# Patient Record
Sex: FEMALE
[De-identification: names, ages, dates, MRNs, and addresses within clinical notes are randomized per-mention and may not be internally consistent; named-entity substitution may affect disease eponyms.]

---

## 2019-12-20 PROBLEM — Z00.00 ENCOUNTER FOR PREVENTIVE HEALTH EXAMINATION: Status: ACTIVE | Noted: 2019-12-20

## 2019-12-30 ENCOUNTER — APPOINTMENT (OUTPATIENT)
Dept: ORTHOPEDIC SURGERY | Facility: CLINIC | Age: 84
End: 2019-12-30
Payer: MEDICARE

## 2019-12-30 VITALS
WEIGHT: 115 LBS | BODY MASS INDEX: 21.16 KG/M2 | HEART RATE: 108 BPM | SYSTOLIC BLOOD PRESSURE: 127 MMHG | DIASTOLIC BLOOD PRESSURE: 69 MMHG | HEIGHT: 62 IN

## 2019-12-30 DIAGNOSIS — Z78.9 OTHER SPECIFIED HEALTH STATUS: ICD-10-CM

## 2019-12-30 DIAGNOSIS — M25.561 PAIN IN RIGHT KNEE: ICD-10-CM

## 2019-12-30 DIAGNOSIS — M25.562 PAIN IN RIGHT KNEE: ICD-10-CM

## 2019-12-30 PROCEDURE — 99204 OFFICE O/P NEW MOD 45 MIN: CPT

## 2019-12-30 PROCEDURE — 73564 X-RAY EXAM KNEE 4 OR MORE: CPT | Mod: RT

## 2019-12-30 RX ORDER — LEVOTHYROXINE SODIUM 0.1 MG/1
100 TABLET ORAL
Refills: 0 | Status: ACTIVE | COMMUNITY

## 2019-12-30 RX ORDER — HYLAN G-F 20 16MG/2ML
16 SYRINGE (ML) INTRAARTICULAR
Qty: 1 | Refills: 0 | Status: ACTIVE | OUTPATIENT
Start: 2019-12-30

## 2019-12-30 RX ORDER — BENAZEPRIL HYDROCHLORIDE AND HYDROCHLOROTHIAZIDE 20; 25 MG/1; MG/1
20-25 TABLET, FILM COATED ORAL
Refills: 0 | Status: ACTIVE | COMMUNITY

## 2019-12-30 RX ORDER — ATORVASTATIN CALCIUM 10 MG/1
10 TABLET, FILM COATED ORAL
Refills: 0 | Status: ACTIVE | COMMUNITY

## 2019-12-30 NOTE — DISCUSSION/SUMMARY
[de-identified] : The patient was advised her findings and showed her x-rays. 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 She is a candidate for Visco supplementation with particular reference to her right knee, being her most symptomatic side. Therefore, authorization should be obtained to proceed with viscosupplementation to the right knee, accordingly

## 2019-12-30 NOTE — HISTORY OF PRESENT ILLNESS
[Worsening] : worsening [___ yrs] : [unfilled] year(s) ago [5] : an average pain level of 5/10 [Intermit.] : ~He/She~ states the symptoms seem to be intermittent [Bending] : worsened by bending [Walking] : worsened by walking [Knee Flexion] : worsened with knee flexion [de-identified] : Pt presents for initial evaluation with pain in bilateral knees, more so on the right knee. Pt is an active 89 year old female, with no known injury.. Pt is not taking any medication for pain. She is requesting information on "gel shots". Pt is using a cane for ambulation. Pt lives at  home with her sister. [de-identified] : s [de-identified] : stairs

## 2019-12-30 NOTE — PHYSICAL EXAM
[de-identified] : Physical examination of both knees discloses a joint motion functionally intact between 5 and 120° with minimal tenderness. Mild crepitus is present to the patellofemoral joint bilaterally.no acute effusions [de-identified] : X-rays taken of both knees and AP lateral skyline open notch views disclosed mild/moderate space narrowing

## 2020-01-08 ENCOUNTER — APPOINTMENT (OUTPATIENT)
Dept: ORTHOPEDIC SURGERY | Facility: CLINIC | Age: 85
End: 2020-01-08
Payer: MEDICARE

## 2020-01-08 PROCEDURE — 20611 DRAIN/INJ JOINT/BURSA W/US: CPT | Mod: RT

## 2020-01-08 PROCEDURE — 99213 OFFICE O/P EST LOW 20 MIN: CPT | Mod: 25

## 2020-01-08 NOTE — PROCEDURE
[Injection] : Injection [Right] : of the right [Joint Pain] : Joint pain [Alcohol] : Alcohol [Patient] : patient [Ultrasound Guided] : The procedure was ultrasound guided.   [Lateral] : lateral [Superior] : superior [1.5  inch] : 1.5 inch needle [22] : a 22-gauge [2 mL Synvisc___(lot #)] : 2 mL Synvisc ~Ulot# [unfilled] [Bandage Applied] : a bandage [Tolerated Well] : The patient tolerated the procedure well [None] : none [No Strenuous Activity___day(s)] : avoid strenuous activity for [unfilled] day(s) [___ Week(s)] : in [unfilled] week(s) [de-identified] : Pt is receiving her first synvisc injection to her right knee.\par \par under sterile technique using ultrasound guided needle plates the patient was given her first injection of Synvisc. The medication was administered without locations, she tolerated procedure well and left the office in stable condition.

## 2020-01-08 NOTE — DISCUSSION/SUMMARY
[de-identified] : Patient was given post injection followup instructions accordingly. She will return next week for her second of 3 Synvisc injections to the right knee

## 2020-01-08 NOTE — REVIEW OF SYSTEMS
[Arthralgia] : arthralgia [Joint Pain] : joint pain [Joint Stiffness] : joint stiffness [Negative] : Psychiatric

## 2020-01-08 NOTE — PHYSICAL EXAM
[de-identified] : Examination of the right knee discloses medial sided joint line tenderness slight limitation of motion him a flexing to 110°

## 2020-01-08 NOTE — HISTORY OF PRESENT ILLNESS
[Worsening] : worsening [___ yrs] : [unfilled] year(s) ago [5] : a minimum pain level of 5/10 [10] : a maximum pain level of 10/10 [Walking] : walking [Intermit.] : ~He/She~ states the symptoms seem to be intermittent [de-identified] : Pt returns for follow up for pain in her right knee. Pt is here to receive her first Synvisc injection to her right knee.

## 2020-01-15 ENCOUNTER — APPOINTMENT (OUTPATIENT)
Dept: ORTHOPEDIC SURGERY | Facility: CLINIC | Age: 85
End: 2020-01-15
Payer: MEDICARE

## 2020-01-15 PROCEDURE — 20611 DRAIN/INJ JOINT/BURSA W/US: CPT | Mod: RT

## 2020-01-15 NOTE — PROCEDURE
[Injection] : Injection [Joint Pain] : Joint pain [Right] : of the right [Ultrasound Guided] : The procedure was ultrasound guided.   [Patient] : patient [Alcohol] : Alcohol [2 mL Synvisc___(lot #)] : 2 mL Synvisc ~Ulot# [unfilled] [22] : a 22-gauge [1.5  inch] : 1.5 inch needle [Bandage Applied] : a bandage [Tolerated Well] : The patient tolerated the procedure well [None] : none [No Strenuous Activity___day(s)] : avoid strenuous activity for [unfilled] day(s) [___ Week(s)] : in [unfilled] week(s) [de-identified] : Pt returns for her second synvisc injection to the right knee.\par \par A discussion took place with the patient regarding the procedure. General risks and benefits were reviewed.\par The suprapatellar region of the knee was prepped to attain a sterile field. Ethyl Chloride spray was used as a topical anesthetic. \par A 22-gauge 1-1/2 inch needle was used to inject the medication.\par The procedure was performed utilizing ultrasound guided needle placement with the Sonosite linear transducer in order to visualize and confirm the location of the needle tip and intra-articular delivery of the medication in the exact location desired to achieve maximal benefit from the medication. The images were recorded and saved.\par The injection site was sterilely dressed and the patient tolerated the procedure well, without complications.\par The patient was given post injection instructions including rest, cool pack applications, and take NSAIDs or acetaminophen. The patient was advised that if there are worsening symptoms or any associated problems, to  contact our office accordingly

## 2020-01-22 ENCOUNTER — APPOINTMENT (OUTPATIENT)
Dept: ORTHOPEDIC SURGERY | Facility: CLINIC | Age: 85
End: 2020-01-22
Payer: MEDICARE

## 2020-01-22 DIAGNOSIS — M17.11 UNILATERAL PRIMARY OSTEOARTHRITIS, RIGHT KNEE: ICD-10-CM

## 2020-01-22 PROCEDURE — 20611 DRAIN/INJ JOINT/BURSA W/US: CPT | Mod: RT

## 2020-01-22 RX ORDER — HYLAN G-F 20 16MG/2ML
16 SYRINGE (ML) INTRAARTICULAR
Qty: 1 | Refills: 0 | Status: ACTIVE | OUTPATIENT
Start: 2020-01-22

## 2020-01-22 NOTE — PROCEDURE
[de-identified] : Pt returns  for her third injection of Synvisc #[id number] to the right knee.\par \par A discussion took place with the patient regarding the procedure. General risks and benefits were reviewed.\par The lateral suprapatellar region of the knee was prepped to attain a sterile field. Ethyl Chloride spray was used as a topical anesthetic. \par A 22-gauge 1-1/2 inch needle was used to inject the medication.\par The procedure was performed utilizing ultrasound guided needle placement with the Sonosite linear transducer in order to visualize and confirm the location of the needle tip and intra-articular delivery of the medication in the exact location desired to achieve maximal benefit from the medication. The images were recorded and saved.\par The injection site was sterilely dressed and the patient tolerated the procedure well, without complications.\par The patient was given post injection instructions including rest, cool pack applications, and take NSAIDs or acetaminophen. The patient was advised that if there are worsening symptoms or any associated problems, to  contact our office accordingly\par \par Patient will schedule followup  treatments for the contralateral left knee Once we have received authorization

## 2020-01-24 RX ORDER — HYALURONATE SODIUM 20 MG/2 ML
20 SYRINGE (ML) INTRAARTICULAR
Qty: 1 | Refills: 0 | Status: ACTIVE | COMMUNITY
Start: 2020-01-24 | End: 1900-01-01

## 2020-02-26 ENCOUNTER — APPOINTMENT (OUTPATIENT)
Dept: ORTHOPEDIC SURGERY | Facility: CLINIC | Age: 85
End: 2020-02-26
Payer: MEDICARE

## 2020-02-26 PROCEDURE — 99213 OFFICE O/P EST LOW 20 MIN: CPT | Mod: 25

## 2020-02-26 PROCEDURE — 20611 DRAIN/INJ JOINT/BURSA W/US: CPT | Mod: LT

## 2020-02-26 NOTE — PHYSICAL EXAM
[de-identified] : physical examination of the left knee discloses range of motion between 5-110° flexion with mild effusion.medial sided joint line tenderness his present

## 2020-02-26 NOTE — PROCEDURE
[de-identified] : first  euflexxa visco injection of Euflexxa was administered to the left knee\par \par A discussion took place with the patient regarding the procedure. General risks and benefits were reviewed.\par The suprapatellar region of the knee was prepped to attain a sterile field. Ethyl Chloride spray was used as a topical anesthetic. \par A 22-gauge 1-1/2 inch needle was used to inject the medication.\par The procedure was performed utilizing ultrasound guided needle placement with the Sonosite linear transducer in order to visualize and confirm the location of the needle tip and intra-articular delivery of the medication in the exact location desired to achieve maximal benefit from the medication. The images were recorded and saved.\par The injection site was sterilely dressed and the patient tolerated the procedure well, without complications.\par The patient was given post injection instructions including rest, cool pack applications, and take NSAIDs or acetaminophen. The patient was advised that if there are worsening symptoms or any associated problems, to  contact our office accordingly

## 2020-02-26 NOTE — DISCUSSION/SUMMARY
[de-identified] : Following the patient's first Euflexxa injection she will return in one week for her second of 3 injections to the left knee

## 2020-03-04 ENCOUNTER — APPOINTMENT (OUTPATIENT)
Dept: ORTHOPEDIC SURGERY | Facility: CLINIC | Age: 85
End: 2020-03-04
Payer: MEDICARE

## 2020-03-04 DIAGNOSIS — M17.12 UNILATERAL PRIMARY OSTEOARTHRITIS, LEFT KNEE: ICD-10-CM

## 2020-03-04 PROCEDURE — 20611 DRAIN/INJ JOINT/BURSA W/US: CPT | Mod: LT

## 2020-03-04 NOTE — PROCEDURE
[de-identified] : Pt returns for follow up for her left knee pain pt is receiving her second Euflexxa #[id number] left knee.\par \par A discussion took place with the patient regarding the procedure. General risks and benefits were reviewed.\par The suprapatellar region of the knee was prepped to attain a sterile field. Ethyl Chloride spray was used as a topical anesthetic. \par A 22-gauge 1-1/2 inch needle was used to inject the medication.\par The procedure was performed utilizing ultrasound guided needle placement with the Sonosite linear transducer in order to visualize and confirm the location of the needle tip and intra-articular delivery of the medication in the exact location desired to achieve maximal benefit from the medication. The images were recorded and saved.\par The injection site was sterilely dressed and the patient tolerated the procedure well, without complications.\par The patient was given post injection instructions including rest, cool pack applications, and take NSAIDs or acetaminophen. The patient was advised that if there are worsening symptoms or any associated problems, to  contact our office accordingly

## 2020-03-11 ENCOUNTER — APPOINTMENT (OUTPATIENT)
Dept: ORTHOPEDIC SURGERY | Facility: CLINIC | Age: 85
End: 2020-03-11